# Patient Record
Sex: FEMALE | Race: BLACK OR AFRICAN AMERICAN | URBAN - METROPOLITAN AREA
[De-identification: names, ages, dates, MRNs, and addresses within clinical notes are randomized per-mention and may not be internally consistent; named-entity substitution may affect disease eponyms.]

---

## 2018-11-19 ENCOUNTER — HISTORICAL (OUTPATIENT)
Dept: SURGERY | Facility: HOSPITAL | Age: 53
End: 2018-11-19

## 2022-04-07 ENCOUNTER — HISTORICAL (OUTPATIENT)
Dept: ADMINISTRATIVE | Facility: HOSPITAL | Age: 57
End: 2022-04-07

## 2022-04-24 VITALS
OXYGEN SATURATION: 100 % | WEIGHT: 251.31 LBS | DIASTOLIC BLOOD PRESSURE: 81 MMHG | HEIGHT: 65 IN | SYSTOLIC BLOOD PRESSURE: 140 MMHG | BODY MASS INDEX: 41.87 KG/M2

## 2022-05-01 NOTE — HISTORICAL [facility] CERNER
This is a historical note converted from [employer]. Formatting and pictures may have been removed.  Please reference [employer] for original formatting and attached multimedia. Indication for Surgery  51 yo F  referred to me for hysteroscopic polypectomy  Preoperative Diagnosis  abnormal uterine bleeding, prolapsing cervical fibroid  Postoperative Diagnosis  submucosal leiomyoma, prolapsing, abnormal uterine bleeding  Operation  hysteroscopic myomectomy, D&C  Surgeon(s)  Dr. RACHEL Lopez  Assistant  none  Anesthesia  GETA  Estimated Blood Loss  30cc  ?  IVF: 800cc  Urine Output  250cc  Findings  large 6cm x 4cm myoma protruding through external os, base ~2cm at fundus- bleeding noted from attempted at removal in office earlier that morning.? Cervix smooth in contour, no masses.? Left tubal ostia normal in appearance, right ostia not seen due to large fibroid base.??Small amount of?fibroid remaining deep submucosal- suspected to be less than 1/10th total mass size.? Endometrium with small 1cm polyp, otherwise proliferative appearing.? Appears to have some hypervascularity to left lateral isthmus- no additional discrete myomas.  Specimen(s)  endometrial mass  endometrial curettings  Complications  stitch placed at  Technique  The patient was taken to the operating suite with IV fluids running and placed in supine position. SCDs were placed and turned on for DVT prophylaxis. The patients arms were placed at her side with padding. General endotracheal anesthesia was then administered. The patient was then placed in lithotomy position with care taken to avoid pressure on vulnerable pressure points. She was then prepped and draped in usual sterile fashion and an in and out catheterization performed.  A timeout procedure was performed per protocol. The weighted speculum was then placed followed by a single toothed tenaculum on the anterior cervical lip. 10cc of dilute vasopressin was injected into the cervix/myometrial  junction (20units in 30cc). The resectoscope with sorbitol and 90 degree monopolar loop was the utilized to visualize above the myoma.? The endometrium was hydrodistended in order to visualize the endometrial cavity.??  ?The base and sides of the myoma were then resected?using the monopolar loop on 50/50 settings.? During the removal of tissue, the deficit was no larger than 350cc at any point.? Given the large size of?the myoma, this required approximately 15 minutes to resect.??The small polyp was removed with the resectoscope and all tissue sent for permanent pathology.?  A final inspection?was performed with?the resectoscope and the small?amount of remaining myoma was deep within the uterine fundus and further resection was not deemed safe or beneficial?at this point.? All resected pathology was?removed.? The hysteroscope was then removed and circumferential curettings were performed for complete uterine sampling. This was sent for permanent pathologic review.?  The tenaculum was removed and there was bleeding from the left site which was brisk.? A figure of eight suture was placed with a 2-0 Vicryl and hemostasis achieved. ?All other instruments were removed.  Counts were correct x 2.  The patient was awakened, extubated and taken to post anesthetic care unit in stable condition.